# Patient Record
Sex: FEMALE | Employment: OTHER | URBAN - NONMETROPOLITAN AREA
[De-identification: names, ages, dates, MRNs, and addresses within clinical notes are randomized per-mention and may not be internally consistent; named-entity substitution may affect disease eponyms.]

---

## 2017-11-30 ENCOUNTER — OFFICE VISIT (OUTPATIENT)
Dept: OBGYN | Age: 67
End: 2017-11-30
Payer: MEDICARE

## 2017-11-30 ENCOUNTER — HOSPITAL ENCOUNTER (OUTPATIENT)
Dept: MAMMOGRAPHY | Age: 67
Discharge: HOME | End: 2017-11-30
Payer: MEDICARE

## 2017-11-30 VITALS
WEIGHT: 229 LBS | HEART RATE: 84 BPM | DIASTOLIC BLOOD PRESSURE: 78 MMHG | BODY MASS INDEX: 39.09 KG/M2 | SYSTOLIC BLOOD PRESSURE: 118 MMHG | HEIGHT: 64 IN

## 2017-11-30 DIAGNOSIS — Z12.31 VISIT FOR SCREENING MAMMOGRAM: ICD-10-CM

## 2017-11-30 DIAGNOSIS — N39.3 SUI (STRESS URINARY INCONTINENCE, FEMALE): ICD-10-CM

## 2017-11-30 DIAGNOSIS — N95.2 VAGINAL ATROPHY: Primary | ICD-10-CM

## 2017-11-30 PROCEDURE — 3017F COLORECTAL CA SCREEN DOC REV: CPT | Performed by: NURSE PRACTITIONER

## 2017-11-30 PROCEDURE — 1036F TOBACCO NON-USER: CPT | Performed by: NURSE PRACTITIONER

## 2017-11-30 PROCEDURE — G8484 FLU IMMUNIZE NO ADMIN: HCPCS | Performed by: NURSE PRACTITIONER

## 2017-11-30 PROCEDURE — 4040F PNEUMOC VAC/ADMIN/RCVD: CPT | Performed by: NURSE PRACTITIONER

## 2017-11-30 PROCEDURE — 77063 BREAST TOMOSYNTHESIS BI: CPT

## 2017-11-30 PROCEDURE — G8399 PT W/DXA RESULTS DOCUMENT: HCPCS | Performed by: NURSE PRACTITIONER

## 2017-11-30 PROCEDURE — 1123F ACP DISCUSS/DSCN MKR DOCD: CPT | Performed by: NURSE PRACTITIONER

## 2017-11-30 PROCEDURE — 0509F URINE INCON PLAN DOCD: CPT | Performed by: NURSE PRACTITIONER

## 2017-11-30 PROCEDURE — 99214 OFFICE O/P EST MOD 30 MIN: CPT | Performed by: NURSE PRACTITIONER

## 2017-11-30 PROCEDURE — 1090F PRES/ABSN URINE INCON ASSESS: CPT | Performed by: NURSE PRACTITIONER

## 2017-11-30 PROCEDURE — 3014F SCREEN MAMMO DOC REV: CPT | Performed by: NURSE PRACTITIONER

## 2017-11-30 PROCEDURE — G8417 CALC BMI ABV UP PARAM F/U: HCPCS | Performed by: NURSE PRACTITIONER

## 2017-11-30 PROCEDURE — G8427 DOCREV CUR MEDS BY ELIG CLIN: HCPCS | Performed by: NURSE PRACTITIONER

## 2017-11-30 RX ORDER — OMEPRAZOLE 20 MG/1
20 CAPSULE, DELAYED RELEASE ORAL
COMMUNITY
Start: 2017-10-23

## 2017-11-30 RX ORDER — HYDROCHLOROTHIAZIDE 12.5 MG/1
CAPSULE, GELATIN COATED ORAL
Refills: 11 | COMMUNITY
Start: 2017-09-29

## 2017-11-30 NOTE — PROGRESS NOTES
Royal Cotton  2017              74 y.o. Chief Complaint   Patient presents with    Gynecologic Exam     annual exam of pap         No LMP recorded. Patient is postmenopausal.           No referring provider defined for this encounter. HPI :Annual Exam  Patient presents for annual exam.  Counseling on healthy lifestyle reviewed, as well as the need for self breast exam. We reviewed the need for Kegal exercises. Still has considerable issues with incontinence. We discussed and reviewed the need for routine screenings and immunization updates when appropriate. Performs monthly self breast exams rarely. Is aware of need and proper technique. The patient is not sexually active. last pap: was normal, last mammogram: was normal  The patient has regular exercise: no . We did review the need for and frequency of both weight bearing and strengthening as tolerated by the patient. ________________________________________________________________________  Obstetric History       T3      L3     SAB0   TAB0   Ectopic0   Molar0   Multiple0   Live Births0       # Outcome Date GA Lbr Teo/2nd Weight Sex Delivery Anes PTL Lv   3 Term     F Vag-Spont      2 Term     F Vag-Spont      1 Term     F Vag-Spont           Past Medical History:   Diagnosis Date    Elevated cholesterol     Endometriosis     Hemorrhoids     Hypertension     Male pattern baldness     Menopause     Age 46    Venous lake of lip     Removed by Dr. Favio Sarkar                                                                   Past Surgical History:   Procedure Laterality Date    APPENDECTOMY  2013    Returned to ER  with fever.  Resolved with course of Cipro    COLONOSCOPY          HEMORRHOID SURGERY  2014    banding     Family History   Problem Relation Age of Onset    Heart Attack Father     Cancer Mother      uterine     Social History     Social History    Marital status:      Spouse name: N/A  Number of children: N/A    Years of education: N/A     Occupational History    Not on file. Social History Main Topics    Smoking status: Never Smoker    Smokeless tobacco: Never Used    Alcohol use No    Drug use: No    Sexual activity: Not on file     Other Topics Concern    Not on file     Social History Narrative    No narrative on file       MEDICATIONS:  Current Outpatient Prescriptions   Medication Sig Dispense Refill    omeprazole (PRILOSEC) 20 MG delayed release capsule Take 20 mg by mouth      hydrochlorothiazide (MICROZIDE) 12.5 MG capsule TAKE 1 CAPSULE BY MOUTH ONE TIME A DAY  11    atorvastatin (LIPITOR) 20 MG tablet Take 20 mg by mouth daily      aspirin 81 MG tablet Take 81 mg by mouth daily.  LISINOPRIL PO Take 10 mg by mouth daily.  FISH OIL by Does not apply route.  Coenzyme Q10 (COQ10 PO) Take  by mouth.  Multiple Vitamins-Minerals (MULTIVITAL PO) Take  by mouth daily.  vitamin D (CHOLECALCIFEROL) 1000 UNIT TABS tablet Take  by mouth daily.  magnesium sulfate crystals by Does not apply route as needed. No current facility-administered medications for this visit. ALLERGIES:  Allergies as of 11/30/2017    (No Known Allergies)           Gynecologic History:  Menarche: 13 yo  Menopause at 47 yo   No LMP recorded. Patient is postmenopausal.  Hormone Exposure: No    Family History of Breast, Ovarian , Colon or Uterine Cancer: Yes mother had endometrial cancer     Preventative Health Testing:  Date of Last Pap Smear: 2016  Date of Last Mammogram: 2016  Date of Last Colonoscopy: 2013  Date of Last Bone Density: 2016  ________________________________________________________________________  REVIEW OF SYSTEMS:     A minimum of an eleven point review of systems was completed.     Review Of Systems (11 point):  General ROS:  negative  Hematological and Lymphatic ROS:negative   Breast ROS: negative  Cardiovascular ROS: negative  Respiratory ROS: appropriate    Breast:  (Chest)  normal appearance, no masses or tenderness, Inspection negative, No nipple retraction or dimpling, No nipple discharge or bleeding, No axillary or supraclavicular adenopathy, Normal to palpation without dominant masses, negative findings: normal in size and symmetry, normal contour with no evidence of flattening or dimpling, skin normal, nipples everted without rashes or discharge, palpation negative for masses or nodules, no palpable axillary lymphadenopathy      Pelvic Exam:  External genitalia: normal general appearance  Urinary system: urethral meatus normal  Vaginal: normal mucosa without prolapse or lesions, normal without tenderness, induration or masses, atrophic mucosa and cystocele present,   Cervix: normal appearance  Adnexa: normal bimanual exam and non palpable  Uterus: normal single, nontender and anteverted  Bimanual exam compromised by body habitus    Musculosk:  Normal Gait and station was noted. Digits were evaluated without abnormal findings. Range of motion, stability and strength were evaluated and found to be appropriate for the patients age. ASSESSMENT:      74 y.o. Annual  1. Vaginal atrophy     2. JESUS (stress urinary incontinence, female)     3. Visit for screening mammogram  ASAF DIGITAL SCREEN W CAD BILATERAL        Chief Complaint   Patient presents with    Gynecologic Exam     annual exam of pap         Past Medical History:   Diagnosis Date    Elevated cholesterol     Endometriosis     Hemorrhoids     Hypertension     Male pattern baldness     Menopause     Age 46    Venous lake of lip     Removed by Dr. Sarika Myers         Patient Active Problem List   Diagnosis    Abdominal pain, other specified site    Cystocele          Hereditary Breast, Ovarian, Colon and Uterine Cancer screening Done.           Tobacco & Secondary smoke risks reviewed; instructed on cessation and avoidance    PLAN:  Return in about 1 year (around 11/30/2018) for Annual

## 2018-12-04 ENCOUNTER — HOSPITAL ENCOUNTER (OUTPATIENT)
Age: 68
Setting detail: SPECIMEN
Discharge: HOME | End: 2018-12-04
Payer: MEDICARE

## 2018-12-04 ENCOUNTER — OFFICE VISIT (OUTPATIENT)
Dept: OBGYN | Age: 68
End: 2018-12-04
Payer: MEDICARE

## 2018-12-04 VITALS
WEIGHT: 219 LBS | BODY MASS INDEX: 37.39 KG/M2 | DIASTOLIC BLOOD PRESSURE: 70 MMHG | HEART RATE: 68 BPM | HEIGHT: 64 IN | SYSTOLIC BLOOD PRESSURE: 116 MMHG

## 2018-12-04 DIAGNOSIS — Z12.4 SCREENING FOR CERVICAL CANCER: ICD-10-CM

## 2018-12-04 DIAGNOSIS — Z01.419 WELL FEMALE EXAM WITH ROUTINE GYNECOLOGICAL EXAM: ICD-10-CM

## 2018-12-04 DIAGNOSIS — N64.89 OTHER SPECIFIED DISORDERS OF BREAST: ICD-10-CM

## 2018-12-04 DIAGNOSIS — N84.2 VAGINAL POLYP: ICD-10-CM

## 2018-12-04 DIAGNOSIS — N63.0 BREAST LUMP: Primary | ICD-10-CM

## 2018-12-04 PROCEDURE — 1101F PT FALLS ASSESS-DOCD LE1/YR: CPT | Performed by: NURSE PRACTITIONER

## 2018-12-04 PROCEDURE — G8484 FLU IMMUNIZE NO ADMIN: HCPCS | Performed by: NURSE PRACTITIONER

## 2018-12-04 PROCEDURE — 1036F TOBACCO NON-USER: CPT | Performed by: NURSE PRACTITIONER

## 2018-12-04 PROCEDURE — G0101 CA SCREEN;PELVIC/BREAST EXAM: HCPCS | Performed by: NURSE PRACTITIONER

## 2018-12-04 PROCEDURE — G8427 DOCREV CUR MEDS BY ELIG CLIN: HCPCS | Performed by: NURSE PRACTITIONER

## 2018-12-04 PROCEDURE — 4040F PNEUMOC VAC/ADMIN/RCVD: CPT | Performed by: NURSE PRACTITIONER

## 2018-12-04 PROCEDURE — G8417 CALC BMI ABV UP PARAM F/U: HCPCS | Performed by: NURSE PRACTITIONER

## 2018-12-04 PROCEDURE — 1123F ACP DISCUSS/DSCN MKR DOCD: CPT | Performed by: NURSE PRACTITIONER

## 2018-12-04 PROCEDURE — G8399 PT W/DXA RESULTS DOCUMENT: HCPCS | Performed by: NURSE PRACTITIONER

## 2018-12-04 PROCEDURE — 1090F PRES/ABSN URINE INCON ASSESS: CPT | Performed by: NURSE PRACTITIONER

## 2018-12-04 PROCEDURE — G0145 SCR C/V CYTO,THINLAYER,RESCR: HCPCS

## 2018-12-04 PROCEDURE — 3017F COLORECTAL CA SCREEN DOC REV: CPT | Performed by: NURSE PRACTITIONER

## 2018-12-04 PROCEDURE — 99214 OFFICE O/P EST MOD 30 MIN: CPT | Performed by: NURSE PRACTITIONER

## 2018-12-04 NOTE — PROGRESS NOTES
Relation Age of Onset    Heart Attack Father     Cancer Mother         uterine     Social History     Social History    Marital status:      Spouse name: N/A    Number of children: N/A    Years of education: N/A     Occupational History    Not on file. Social History Main Topics    Smoking status: Never Smoker    Smokeless tobacco: Never Used    Alcohol use No    Drug use: No    Sexual activity: Not on file     Other Topics Concern    Not on file     Social History Narrative    No narrative on file       MEDICATIONS:  Current Outpatient Prescriptions   Medication Sig Dispense Refill    omeprazole (PRILOSEC) 20 MG delayed release capsule Take 20 mg by mouth      hydrochlorothiazide (MICROZIDE) 12.5 MG capsule TAKE 1 CAPSULE BY MOUTH ONE TIME A DAY  11    atorvastatin (LIPITOR) 20 MG tablet Take 20 mg by mouth daily      aspirin 81 MG tablet Take 81 mg by mouth daily.  Multiple Vitamins-Minerals (MULTIVITAL PO) Take  by mouth daily.  vitamin D (CHOLECALCIFEROL) 1000 UNIT TABS tablet Take  by mouth daily.  magnesium sulfate crystals by Does not apply route as needed.  LISINOPRIL PO Take 10 mg by mouth daily.  FISH OIL by Does not apply route.  Coenzyme Q10 (COQ10 PO) Take  by mouth. No current facility-administered medications for this visit. ALLERGIES:  Allergies as of 12/04/2018    (No Known Allergies)           Gynecologic History:  Menarche: 5 yo  Menopause at 45 yo     No LMP recorded. Patient is postmenopausal.  Hormone Exposure: No    Family History of Breast, Ovarian , Colon or Uterine Cancer:  Mother had uterine cancer     Preventative Health Testing:  Date of Last Pap Smear: 2016  Date of Last Mammogram: 2017  Date of Last Colonoscopy: 2013  Date of Last Bone Density: 2016  ________________________________________________________________________  REVIEW OF SYSTEMS:     A minimum of an eleven point review of systems was Patient Active Problem List   Diagnosis    Abdominal pain, other specified site    Cystocele          Hereditary Breast, Ovarian, Colon and Uterine Cancer screening Done. Tobacco & Secondary smoke risks reviewed; instructed on cessation and avoidance    PLAN:  No Follow-up on file. Repeat pap per ASCCP 2013 guidelines  Return for annual exams  Mammograms every 1 year. If 33 yo and last mammogram was negative. Routine health maintenance per patients PCP. Orders Placed This Encounter   Procedures    ASAF DIGITAL DIAGNOSTIC W OR WO CAD BILATERAL     Standing Status:   Future     Standing Expiration Date:   3/14/2019     Order Specific Question:   Reason for exam:     Answer:   lump/ridge upper outer quadrant left breast N+8 cm, lump/ridge on right 10-12 o'clock N+6 cm    PAP SMEAR     Standing Status:   Future     Standing Expiration Date:   2/4/2019     Order Specific Question:   Collection Type     Answer: Thin Prep     Order Specific Question:   Prior Abnormal Pap Test     Answer:   No     Order Specific Question:   Screening or Diagnostic     Answer:   Screening     Order Specific Question:   HPV Requested?      Answer:   Yes - If Abnormal Reflex HPV     Order Specific Question:   High Risk Patient     Answer:   N/A       Nathaniel Pedroza  12/4/2018      (Please note that portions of this note were completed with a voice-recognition program. Efforts were made to edit the dictations but occasionally words are mis-transcribed.)

## 2018-12-10 ENCOUNTER — HOSPITAL ENCOUNTER (OUTPATIENT)
Dept: ULTRASOUND IMAGING | Age: 68
Discharge: HOME | End: 2018-12-12
Payer: MEDICARE

## 2018-12-10 ENCOUNTER — HOSPITAL ENCOUNTER (OUTPATIENT)
Dept: MAMMOGRAPHY | Age: 68
Discharge: HOME | End: 2018-12-12
Payer: MEDICARE

## 2018-12-10 ENCOUNTER — APPOINTMENT (OUTPATIENT)
Dept: ULTRASOUND IMAGING | Age: 68
End: 2018-12-10
Payer: MEDICARE

## 2018-12-10 DIAGNOSIS — N64.89 OTHER SPECIFIED DISORDERS OF BREAST: ICD-10-CM

## 2018-12-10 DIAGNOSIS — N63.0 BREAST LUMP: ICD-10-CM

## 2018-12-10 PROCEDURE — 76642 ULTRASOUND BREAST LIMITED: CPT

## 2018-12-10 PROCEDURE — G0279 TOMOSYNTHESIS, MAMMO: HCPCS

## 2018-12-12 ENCOUNTER — HOSPITAL ENCOUNTER (OUTPATIENT)
Age: 68
Setting detail: SPECIMEN
Discharge: HOME | End: 2018-12-12
Payer: MEDICARE

## 2018-12-12 ENCOUNTER — PROCEDURE VISIT (OUTPATIENT)
Dept: OBGYN | Age: 68
End: 2018-12-12
Payer: MEDICARE

## 2018-12-12 VITALS
BODY MASS INDEX: 37.22 KG/M2 | HEART RATE: 82 BPM | DIASTOLIC BLOOD PRESSURE: 80 MMHG | HEIGHT: 64 IN | WEIGHT: 218 LBS | SYSTOLIC BLOOD PRESSURE: 130 MMHG | RESPIRATION RATE: 18 BRPM

## 2018-12-12 DIAGNOSIS — N84.2 VAGINAL POLYP: ICD-10-CM

## 2018-12-12 DIAGNOSIS — N84.2 VAGINAL POLYP: Primary | ICD-10-CM

## 2018-12-12 PROCEDURE — 57135 EXCISION VAGINAL CYST/TUMOR: CPT | Performed by: OBSTETRICS & GYNECOLOGY

## 2018-12-12 PROCEDURE — 99201 PR OFFICE OUTPATIENT NEW 10 MINUTES: CPT | Performed by: OBSTETRICS & GYNECOLOGY

## 2018-12-12 PROCEDURE — 88304 TISSUE EXAM BY PATHOLOGIST: CPT

## 2018-12-14 LAB — CYTOLOGY REPORT: NORMAL

## 2018-12-15 LAB — SURGICAL PATHOLOGY REPORT: NORMAL

## 2019-01-14 ENCOUNTER — OFFICE VISIT (OUTPATIENT)
Dept: OBGYN | Age: 69
End: 2019-01-14
Payer: MEDICARE

## 2019-01-14 VITALS
BODY MASS INDEX: 38.58 KG/M2 | HEIGHT: 64 IN | WEIGHT: 226 LBS | HEART RATE: 76 BPM | DIASTOLIC BLOOD PRESSURE: 80 MMHG | SYSTOLIC BLOOD PRESSURE: 122 MMHG

## 2019-01-14 DIAGNOSIS — N60.11 FIBROCYSTIC BREAST CHANGES OF BOTH BREASTS: Primary | ICD-10-CM

## 2019-01-14 DIAGNOSIS — Z12.31 VISIT FOR SCREENING MAMMOGRAM: ICD-10-CM

## 2019-01-14 DIAGNOSIS — N60.12 FIBROCYSTIC BREAST CHANGES OF BOTH BREASTS: Primary | ICD-10-CM

## 2019-01-14 PROCEDURE — 1123F ACP DISCUSS/DSCN MKR DOCD: CPT | Performed by: NURSE PRACTITIONER

## 2019-01-14 PROCEDURE — G8399 PT W/DXA RESULTS DOCUMENT: HCPCS | Performed by: NURSE PRACTITIONER

## 2019-01-14 PROCEDURE — 1101F PT FALLS ASSESS-DOCD LE1/YR: CPT | Performed by: NURSE PRACTITIONER

## 2019-01-14 PROCEDURE — G8484 FLU IMMUNIZE NO ADMIN: HCPCS | Performed by: NURSE PRACTITIONER

## 2019-01-14 PROCEDURE — 1036F TOBACCO NON-USER: CPT | Performed by: NURSE PRACTITIONER

## 2019-01-14 PROCEDURE — G8417 CALC BMI ABV UP PARAM F/U: HCPCS | Performed by: NURSE PRACTITIONER

## 2019-01-14 PROCEDURE — 99213 OFFICE O/P EST LOW 20 MIN: CPT | Performed by: NURSE PRACTITIONER

## 2019-01-14 PROCEDURE — 1090F PRES/ABSN URINE INCON ASSESS: CPT | Performed by: NURSE PRACTITIONER

## 2019-01-14 PROCEDURE — 4040F PNEUMOC VAC/ADMIN/RCVD: CPT | Performed by: NURSE PRACTITIONER

## 2019-01-14 PROCEDURE — 3017F COLORECTAL CA SCREEN DOC REV: CPT | Performed by: NURSE PRACTITIONER

## 2019-01-14 PROCEDURE — G8427 DOCREV CUR MEDS BY ELIG CLIN: HCPCS | Performed by: NURSE PRACTITIONER

## 2019-12-10 ENCOUNTER — OFFICE VISIT (OUTPATIENT)
Dept: OBGYN | Age: 69
End: 2019-12-10
Payer: MEDICARE

## 2019-12-10 ENCOUNTER — HOSPITAL ENCOUNTER (OUTPATIENT)
Dept: MAMMOGRAPHY | Age: 69
Discharge: HOME | End: 2019-12-12
Payer: MEDICARE

## 2019-12-10 VITALS
DIASTOLIC BLOOD PRESSURE: 82 MMHG | HEIGHT: 63 IN | BODY MASS INDEX: 42.52 KG/M2 | WEIGHT: 240 LBS | HEART RATE: 69 BPM | TEMPERATURE: 97.8 F | SYSTOLIC BLOOD PRESSURE: 116 MMHG

## 2019-12-10 DIAGNOSIS — R06.09 EXERTIONAL DYSPNEA: ICD-10-CM

## 2019-12-10 DIAGNOSIS — N60.12 FIBROCYSTIC BREAST CHANGES OF BOTH BREASTS: ICD-10-CM

## 2019-12-10 DIAGNOSIS — Z12.31 VISIT FOR SCREENING MAMMOGRAM: ICD-10-CM

## 2019-12-10 DIAGNOSIS — N60.11 FIBROCYSTIC BREAST CHANGES OF BOTH BREASTS: ICD-10-CM

## 2019-12-10 DIAGNOSIS — R32 FEMALE INCONTINENCE: ICD-10-CM

## 2019-12-10 DIAGNOSIS — Z12.31 OTHER SCREENING MAMMOGRAM: ICD-10-CM

## 2019-12-10 DIAGNOSIS — N95.2 VAGINAL ATROPHY: Primary | ICD-10-CM

## 2019-12-10 PROCEDURE — 0509F URINE INCON PLAN DOCD: CPT | Performed by: NURSE PRACTITIONER

## 2019-12-10 PROCEDURE — G8417 CALC BMI ABV UP PARAM F/U: HCPCS | Performed by: NURSE PRACTITIONER

## 2019-12-10 PROCEDURE — 4040F PNEUMOC VAC/ADMIN/RCVD: CPT | Performed by: NURSE PRACTITIONER

## 2019-12-10 PROCEDURE — G8484 FLU IMMUNIZE NO ADMIN: HCPCS | Performed by: NURSE PRACTITIONER

## 2019-12-10 PROCEDURE — G8399 PT W/DXA RESULTS DOCUMENT: HCPCS | Performed by: NURSE PRACTITIONER

## 2019-12-10 PROCEDURE — 77063 BREAST TOMOSYNTHESIS BI: CPT

## 2019-12-10 PROCEDURE — 1123F ACP DISCUSS/DSCN MKR DOCD: CPT | Performed by: NURSE PRACTITIONER

## 2019-12-10 PROCEDURE — 3017F COLORECTAL CA SCREEN DOC REV: CPT | Performed by: NURSE PRACTITIONER

## 2019-12-10 PROCEDURE — G8427 DOCREV CUR MEDS BY ELIG CLIN: HCPCS | Performed by: NURSE PRACTITIONER

## 2019-12-10 PROCEDURE — 1090F PRES/ABSN URINE INCON ASSESS: CPT | Performed by: NURSE PRACTITIONER

## 2019-12-10 PROCEDURE — 1036F TOBACCO NON-USER: CPT | Performed by: NURSE PRACTITIONER

## 2019-12-10 PROCEDURE — 99214 OFFICE O/P EST MOD 30 MIN: CPT | Performed by: NURSE PRACTITIONER

## 2019-12-10 RX ORDER — DICLOFENAC SODIUM 75 MG/1
75 TABLET, DELAYED RELEASE ORAL
COMMUNITY
Start: 2019-09-10

## 2019-12-10 RX ORDER — POTASSIUM GLUCONATE 595(99)MG
595 TABLET, EXTENDED RELEASE ORAL
COMMUNITY
Start: 2018-12-10

## 2019-12-10 RX ORDER — METOPROLOL SUCCINATE 50 MG/1
TABLET, EXTENDED RELEASE ORAL
COMMUNITY
Start: 2019-11-27

## 2020-12-17 ENCOUNTER — OFFICE VISIT (OUTPATIENT)
Dept: OBGYN | Age: 70
End: 2020-12-17
Payer: MEDICARE

## 2020-12-17 ENCOUNTER — HOSPITAL ENCOUNTER (OUTPATIENT)
Age: 70
Setting detail: SPECIMEN
Discharge: HOME | End: 2020-12-17
Payer: MEDICARE

## 2020-12-17 ENCOUNTER — HOSPITAL ENCOUNTER (OUTPATIENT)
Dept: MAMMOGRAPHY | Age: 70
Discharge: HOME | End: 2020-12-19
Payer: MEDICARE

## 2020-12-17 VITALS
HEART RATE: 80 BPM | WEIGHT: 235 LBS | SYSTOLIC BLOOD PRESSURE: 118 MMHG | DIASTOLIC BLOOD PRESSURE: 78 MMHG | HEIGHT: 64 IN | TEMPERATURE: 98 F | BODY MASS INDEX: 40.12 KG/M2

## 2020-12-17 PROCEDURE — 4040F PNEUMOC VAC/ADMIN/RCVD: CPT | Performed by: NURSE PRACTITIONER

## 2020-12-17 PROCEDURE — 3017F COLORECTAL CA SCREEN DOC REV: CPT | Performed by: NURSE PRACTITIONER

## 2020-12-17 PROCEDURE — 99214 OFFICE O/P EST MOD 30 MIN: CPT

## 2020-12-17 PROCEDURE — 77063 BREAST TOMOSYNTHESIS BI: CPT

## 2020-12-17 PROCEDURE — 1123F ACP DISCUSS/DSCN MKR DOCD: CPT | Performed by: NURSE PRACTITIONER

## 2020-12-17 PROCEDURE — 99214 OFFICE O/P EST MOD 30 MIN: CPT | Performed by: NURSE PRACTITIONER

## 2020-12-17 PROCEDURE — G8399 PT W/DXA RESULTS DOCUMENT: HCPCS | Performed by: NURSE PRACTITIONER

## 2020-12-17 PROCEDURE — G0145 SCR C/V CYTO,THINLAYER,RESCR: HCPCS

## 2020-12-17 PROCEDURE — G0101 CA SCREEN;PELVIC/BREAST EXAM: HCPCS | Performed by: NURSE PRACTITIONER

## 2020-12-17 PROCEDURE — 1090F PRES/ABSN URINE INCON ASSESS: CPT | Performed by: NURSE PRACTITIONER

## 2020-12-17 PROCEDURE — 1036F TOBACCO NON-USER: CPT | Performed by: NURSE PRACTITIONER

## 2020-12-17 PROCEDURE — G8484 FLU IMMUNIZE NO ADMIN: HCPCS | Performed by: NURSE PRACTITIONER

## 2020-12-17 PROCEDURE — G8427 DOCREV CUR MEDS BY ELIG CLIN: HCPCS | Performed by: NURSE PRACTITIONER

## 2020-12-17 PROCEDURE — 0509F URINE INCON PLAN DOCD: CPT | Performed by: NURSE PRACTITIONER

## 2020-12-17 PROCEDURE — G8417 CALC BMI ABV UP PARAM F/U: HCPCS | Performed by: NURSE PRACTITIONER

## 2020-12-17 ASSESSMENT — PATIENT HEALTH QUESTIONNAIRE - PHQ9
1. LITTLE INTEREST OR PLEASURE IN DOING THINGS: 0
SUM OF ALL RESPONSES TO PHQ QUESTIONS 1-9: 0
SUM OF ALL RESPONSES TO PHQ QUESTIONS 1-9: 0
2. FEELING DOWN, DEPRESSED OR HOPELESS: 0
SUM OF ALL RESPONSES TO PHQ9 QUESTIONS 1 & 2: 0
SUM OF ALL RESPONSES TO PHQ QUESTIONS 1-9: 0

## 2020-12-17 NOTE — PROGRESS NOTES
Amara Lav  2020              65 y.o. Chief Complaint   Patient presents with    Gynecologic Exam         No LMP recorded. Patient is postmenopausal.           No referring provider defined for this encounter. HPI :Annual Exam  Patient presents for annual exam.  Counseling on healthy lifestyle reviewed, as well as the need for self breast exam. We reviewed the need for Kegal exercises and literature was provided. We discussed and reviewed the need for routine screenings and immunization updates when appropriate. Denies vaginal bleeding or spotting and is aware of the need to report any she would note in the future. Performs monthly self breast exams rarely. Aware of need and proper technique. Poor bladder control. Wears pad daily. Had right hip replacement 2 months ago with good post-operative course. The patient is not sexually active. last pap: was normal, last mammogram: was normal  The patient has regular exercise: yes . We did review the need for and frequency of both weight bearing and strengthening as tolerated by the patient. ________________________________________________________________________  OB History    Para Term  AB Living   3 3 3 0 0 3   SAB TAB Ectopic Molar Multiple Live Births   0 0 0 0 0 0      # Outcome Date GA Lbr Teo/2nd Weight Sex Delivery Anes PTL Lv   3 Term     F Vag-Spont      2 Term     F Vag-Spont      1 Term     F Vag-Spont        Past Medical History:   Diagnosis Date    Elevated cholesterol     Endometriosis     Hemorrhoids     Hypertension     Male pattern baldness     Menopause     Age 46    Venous lake of lip     Removed by Dr. Preston Lee                                                                   Past Surgical History:   Procedure Laterality Date    APPENDECTOMY  2013    Returned to ER  with fever.  Resolved with course of Cipro    CERVICAL POLYP REMOVAL          COLONOSCOPY        HEMORRHOID SURGERY  2014    banding    TOTAL HIP ARTHROPLASTY  10/2020    Dr. Karly Cruz     Family History   Problem Relation Age of Onset    Heart Attack Father     Cancer Mother         uterine   Diego Ford COPD Sister     Asthma Sister     Heart Failure Sister      Social History     Socioeconomic History    Marital status:      Spouse name: Not on file    Number of children: Not on file    Years of education: Not on file    Highest education level: Not on file   Occupational History    Not on file   Social Needs    Financial resource strain: Not on file    Food insecurity     Worry: Not on file     Inability: Not on file   [employer] needs     Medical: Not on file     Non-medical: Not on file   Tobacco Use    Smoking status: Never Smoker    Smokeless tobacco: Never Used   Substance and Sexual Activity    Alcohol use: No    Drug use: No    Sexual activity: Not on file   Lifestyle    Physical activity     Days per week: Not on file     Minutes per session: Not on file    Stress: Not on file   Relationships    Social connections     Talks on phone: Not on file     Gets together: Not on file     Attends Muslim service: Not on file     Active member of club or organization: Not on file     Attends meetings of clubs or organizations: Not on file     Relationship status: Not on file    Intimate partner violence     Fear of current or ex partner: Not on file     Emotionally abused: Not on file     Physically abused: Not on file     Forced sexual activity: Not on file   Other Topics Concern    Not on file   Social History Narrative    Not on file       MEDICATIONS:  Current Outpatient Medications   Medication Sig Dispense Refill    metoprolol succinate (TOPROL XL) 50 MG extended release tablet TAKE 1 TABLET DAILY      diclofenac (VOLTAREN) 75 MG EC tablet Take 75 mg by mouth      Potassium Gluconate  MG TBCR Take 595 mg by mouth Blood pressure 118/78, pulse 80, temperature 98 °F (36.7 °C), temperature source Temporal, height 5' 4\" (1.626 m), weight 235 lb (106.6 kg), not currently breastfeeding. General Appearance: This  is a well Developed, well Nourished, well groomed female. Her BMI was reviewed. Skin:  There was a Normal Inspection of the skin without rashes or lesions. Lymphatic:  No Lymph Nodes were Palpable in the neck , axilla or groin. Neck and EENT:  The neck was supple. There were no masses   The thyroid was not enlarged and had no masses. PERRLA,     Cardiovascular: The heart was in a regular rate and rhythm. There was no murmur appreciated. Extremities: The patients extremities were without calf tenderness, edema, or varicosities. There was full range of motion in all four extremities. Pulses in all four extremities    Abdomen: The abdomen was soft and non-tender. There was no guarding, rebound or rigidity. On evaluation there was no evidence of hepatosplenomegaly and there was no costal vertebral kamila tenderness bilaterally. No hernias were appreciated. Psych: The patient had a normal Orientation to: Time, Place, Person, and Situation  Mood and affect appropriate    Breast:  (Chest)  normal appearance, no masses or tenderness, Inspection negative, No nipple retraction or dimpling, No nipple discharge or bleeding, No axillary or supraclavicular adenopathy, Normal to palpation without dominant masses      Pelvic Exam:  External genitalia: normal general appearance  Urinary system: urethral meatus normal  Vaginal: atrophic mucosa and cystocele present, (Mild)  Cervix: normal appearance and thin prep PAP obtained  Adnexa: normal bimanual exam and non palpable  Uterus: normal single, nontender  Exam findings compromised by patient's body habitus. Musculosk:  Normal Gait and station was noted. Digits were evaluated without abnormal findings. Range of motion, stability and strength were evaluated and found to be appropriate for the patients age. ASSESSMENT:      65 y.o. Annual   Diagnosis Orders   1. Female incontinence     2. Other screening mammogram  ASAF MARIA TERESA DIGITAL SCREEN BILATERAL   3. Screening for cervical cancer  PAP SMEAR   4. Well female exam with routine gynecological exam  AK CA SCREEN;PELVIC/BREAST EXAM    AK OBTAINING SCREEN PAP SMEAR        Chief Complaint   Patient presents with    Gynecologic Exam         Past Medical History:   Diagnosis Date    Elevated cholesterol     Endometriosis     Hemorrhoids     Hypertension     Male pattern baldness     Menopause     Age 46    Venous lake of lip     Removed by Dr. Marietta Fernando         Patient Active Problem List   Diagnosis    Abdominal pain, other specified site    Cystocele    Exertional dyspnea    Female incontinence          Hereditary Breast, Ovarian, Colon and Uterine Cancer screening Done. Tobacco & Secondary smoke risks reviewed; instructed on cessation and avoidance    PLAN:  Return for Annual Exam, Mammogram.  Return for annual exams  Mammograms every 1 year. If 33 yo and last mammogram was negative. Routine health maintenance per patients PCP. Orders Placed This Encounter   Procedures    ASAF MARIA TERESA DIGITAL SCREEN BILATERAL     Standing Status:   Future     Standing Expiration Date:   6/17/2022    PAP SMEAR     Standing Status:   Future     Number of Occurrences:   1     Standing Expiration Date:   2/17/2021     Order Specific Question:   Collection Type     Answer: Thin Prep     Order Specific Question:   Prior Abnormal Pap Test     Answer:   No     Order Specific Question:   Screening or Diagnostic     Answer:   Screening     Order Specific Question:   HPV Requested?      Answer:   Yes - If Abnormal Reflex HPV     Order Specific Question:   High Risk Patient     Answer:   N/A    AK CA SCREEN;PELVIC/BREAST EXAM    AK OBTAINING SCREEN PAP SMEAR 25 minutes spent face to face, 80% in counseling, education, and coordination of care.     Keith Pedroza  12/17/2020

## 2020-12-30 LAB — CYTOLOGY REPORT: NORMAL

## 2022-02-04 ENCOUNTER — TELEPHONE (OUTPATIENT)
Dept: OBGYN | Age: 72
End: 2022-02-04

## 2022-02-04 NOTE — TELEPHONE ENCOUNTER
Patient called stating she has changed providers and would be doing her mammogram at [facility], order cancelled.

## 2022-12-21 ENCOUNTER — TELEPHONE (OUTPATIENT)
Dept: OBGYN | Age: 72
End: 2022-12-21

## 2024-02-14 NOTE — TELEPHONE ENCOUNTER
Patient states she wants to see her PCP will do the annual exam, if not she will call us for an appt. yes